# Patient Record
Sex: MALE | Race: WHITE | NOT HISPANIC OR LATINO | URBAN - METROPOLITAN AREA
[De-identification: names, ages, dates, MRNs, and addresses within clinical notes are randomized per-mention and may not be internally consistent; named-entity substitution may affect disease eponyms.]

---

## 2024-10-22 DIAGNOSIS — M25.561 RIGHT KNEE PAIN, UNSPECIFIED CHRONICITY: Primary | ICD-10-CM

## 2024-10-23 NOTE — PROGRESS NOTES
History of Present Illness   He is here today for evaluation of right knee pain.  He states pain has been going on for few months.  He does not note any obvious injury or trauma to the area.  He states the pain is sharp and sudden.  He isolates the pain over the anterior patella he does not have pain to the quadriceps or patellar tendons.  When the pain happens he rates it about an 8 out of 10.  He has tried ice and Aleve with minimal relief.  He is not on any anticoagulant medications and he is not diabetic.     Review of Systems   GENERAL: Negative for malaise, significant weight loss, fever  MUSCULOSKELETAL: see HPI  NEURO:  Negative     No past medical history on file.     Medication Documentation Review Audit    **Prior to Admission medications have not yet been reviewed**          Physical Exam  Right knee  Skin is intact without any evidence of erythema ecchymosis or effusion.  He has tenderness to palpation over the anterior patella  There is no hyperesthesia about the patella.  Range of motion is 0 to 130 degrees  The knee is stable in varus valgus and anterior posterior stresses  He is distally neurovascularly intact     Imaging  See dictated report       Assessment   Prepatellar bursitis     Plan:  I do not think this is neurologic in nature.  Although there is no swelling the only thing I can assume that this is a bursal issue.  Right knee prepatellar bursa corticosteroid injection  Ice massage  Follow up in 1 month  All questions answered    L Inj/Asp: R patellar bursa on 10/24/2024 3:43 PM  Indications: pain  Details: 21 G needle, anterolateral approach  Medications: 1 mL lidocaine 10 mg/mL (1 %); 1 mg betamethasone acet,sod phos 6 mg/mL  Outcome: tolerated well, no immediate complications  Procedure, treatment alternatives, risks and benefits explained, specific risks discussed. Consent was given by the patient. Immediately prior to procedure a time out was called to verify the correct  patient, procedure, equipment, support staff and site/side marked as required. Patient was prepped and draped in the usual sterile fashion.

## 2024-10-24 ENCOUNTER — APPOINTMENT (OUTPATIENT)
Dept: ORTHOPEDIC SURGERY | Facility: CLINIC | Age: 49
End: 2024-10-24
Payer: COMMERCIAL

## 2024-10-24 ENCOUNTER — HOSPITAL ENCOUNTER (OUTPATIENT)
Dept: RADIOLOGY | Facility: CLINIC | Age: 49
Discharge: HOME | End: 2024-10-24
Payer: COMMERCIAL

## 2024-10-24 DIAGNOSIS — M25.561 RIGHT KNEE PAIN, UNSPECIFIED CHRONICITY: ICD-10-CM

## 2024-10-24 DIAGNOSIS — M70.41 PREPATELLAR BURSITIS OF RIGHT KNEE: Primary | ICD-10-CM

## 2024-10-24 PROCEDURE — 20610 DRAIN/INJ JOINT/BURSA W/O US: CPT | Performed by: PHYSICIAN ASSISTANT

## 2024-10-24 PROCEDURE — 73564 X-RAY EXAM KNEE 4 OR MORE: CPT | Mod: RT

## 2024-10-24 PROCEDURE — 1036F TOBACCO NON-USER: CPT | Performed by: ORTHOPAEDIC SURGERY

## 2024-10-24 PROCEDURE — 99204 OFFICE O/P NEW MOD 45 MIN: CPT | Performed by: ORTHOPAEDIC SURGERY

## 2024-10-24 RX ORDER — LIDOCAINE HYDROCHLORIDE 10 MG/ML
1 INJECTION, SOLUTION INFILTRATION; PERINEURAL
Status: COMPLETED | OUTPATIENT
Start: 2024-10-24 | End: 2024-10-24

## 2024-10-24 RX ORDER — BETAMETHASONE SODIUM PHOSPHATE AND BETAMETHASONE ACETATE 3; 3 MG/ML; MG/ML
1 INJECTION, SUSPENSION INTRA-ARTICULAR; INTRALESIONAL; INTRAMUSCULAR; SOFT TISSUE
Status: COMPLETED | OUTPATIENT
Start: 2024-10-24 | End: 2024-10-24

## 2024-10-24 ASSESSMENT — PAIN - FUNCTIONAL ASSESSMENT: PAIN_FUNCTIONAL_ASSESSMENT: NO/DENIES PAIN

## 2024-11-22 ENCOUNTER — APPOINTMENT (OUTPATIENT)
Dept: ORTHOPEDIC SURGERY | Facility: CLINIC | Age: 49
End: 2024-11-22
Payer: COMMERCIAL

## 2024-11-22 NOTE — PROGRESS NOTES
History of Present Illness   []     Review of Systems   GENERAL: Negative for malaise, significant weight loss, fever  MUSCULOSKELETAL: see HPI  NEURO:  Negative     No past medical history on file.     Medication Documentation Review Audit       Reviewed by Vamsi Currie (Technologist) on 10/24/24 at 1524      Medication Order Taking? Sig Documenting Provider Last Dose Status            No Medications to Display                                    Physical Exam  []     Imaging  XR knee right 4+ views  Narrative: Interpreted By:  Gabriela Brown,   STUDY:  Right knee, four views.      INDICATION:  Signs/Symptoms:pain.      COMPARISON:  None.      ACCESSION NUMBER(S):  [id number]      ORDERING CLINICIAN:  FABIENNE FELDER      FINDINGS:  No acute fracture or malalignment.  Joint spaces are well maintained.  Small knee joint effusion.  Mild prepatellar soft tissue swelling noted. This is nonspecific.      Impression: 1. As above.      MACRO:  None      Signed by: Gabriela Brown 10/27/2024 9:45 PM  Dictation workstation:   [id number]       Assessment   []     Plan  []    Procedures